# Patient Record
Sex: MALE | Race: OTHER | HISPANIC OR LATINO | Employment: FULL TIME | URBAN - METROPOLITAN AREA
[De-identification: names, ages, dates, MRNs, and addresses within clinical notes are randomized per-mention and may not be internally consistent; named-entity substitution may affect disease eponyms.]

---

## 2021-08-17 ENCOUNTER — OFFICE VISIT (OUTPATIENT)
Dept: FAMILY MEDICINE CLINIC | Facility: CLINIC | Age: 31
End: 2021-08-17
Payer: MEDICARE

## 2021-08-17 VITALS
WEIGHT: 226.6 LBS | OXYGEN SATURATION: 97 % | RESPIRATION RATE: 20 BRPM | SYSTOLIC BLOOD PRESSURE: 120 MMHG | TEMPERATURE: 98 F | HEIGHT: 69 IN | HEART RATE: 82 BPM | BODY MASS INDEX: 33.56 KG/M2 | DIASTOLIC BLOOD PRESSURE: 81 MMHG

## 2021-08-17 DIAGNOSIS — Z83.3 FAMILY HISTORY OF DIABETES MELLITUS (DM): ICD-10-CM

## 2021-08-17 DIAGNOSIS — F10.10 ALCOHOL ABUSE: Primary | ICD-10-CM

## 2021-08-17 DIAGNOSIS — Z11.4 SCREENING FOR HIV (HUMAN IMMUNODEFICIENCY VIRUS): ICD-10-CM

## 2021-08-17 DIAGNOSIS — Z11.59 ENCOUNTER FOR HEPATITIS C SCREENING TEST FOR LOW RISK PATIENT: ICD-10-CM

## 2021-08-17 DIAGNOSIS — R53.83 OTHER FATIGUE: ICD-10-CM

## 2021-08-17 PROCEDURE — 99204 OFFICE O/P NEW MOD 45 MIN: CPT | Performed by: PHYSICIAN ASSISTANT

## 2021-08-17 RX ORDER — DISULFIRAM 250 MG/1
250 TABLET ORAL DAILY
Qty: 30 TABLET | Refills: 3 | Status: SHIPPED | OUTPATIENT
Start: 2021-08-17 | End: 2021-08-31 | Stop reason: SDUPTHER

## 2021-08-17 NOTE — ASSESSMENT & PLAN NOTE
Difficult to quantify amount of alcohol he is drinking daily, but it is affecting his daily life and his relationships with his family members  He is drinking even while at work  He admits to drinking at leas a 6-pack of beer daily and additionally will drink whatever he can get access to  This has been a problem for several years  He went to rehab in [place] in March 2021 and reports that he stopped drinking for a month after that  Rehab was mostly group therapy  He does not remember what his first drink was when he started drinking again; he denies any stress or trigger that caused him to start drinking  He is now drinking daily again, but last drink was last night at 10pm      Strongly advised Jhonatan to [facility] for safe withdrawal from alcohol, however he is not interested in any inpatient treatment  I explained that it is not rehab and that it is much different than what he experienced in [facility], but he declined  I explained the dangers associated with alcohol withdrawal in detail  4 DUIs in the past year  He denies any withdrawal symptoms when he was in rehab before  Begin disulfiram 250mg daily  CBC and CMP to be drawn today, and will recheck in 2 weeks to ensure stability of renal and hepatic function  It has been over 12 hours since his last drink, so he may start the medication today  Return in 2 weeks for follow up and to consider uptitration of dose  Andys declines psychology referral at this time; he reports that he has good family support

## 2021-08-17 NOTE — PROGRESS NOTES
Assessment/Plan:    Alcohol abuse  Difficult to quantify amount of alcohol he is drinking daily, but it is affecting his daily life and his relationships with his family members  He is drinking even while at work  He admits to drinking at leas a 6-pack of beer daily and additionally will drink whatever he can get access to  This has been a problem for several years  He went to rehab in [place] in March 2021 and reports that he stopped drinking for a month after that  Rehab was mostly group therapy  He does not remember what his first drink was when he started drinking again; he denies any stress or trigger that caused him to start drinking  He is now drinking daily again, but last drink was last night at 10pm      Strongly advised Jhonatan to consider [facility] for safe withdrawal from alcohol, however he is not interested in any inpatient treatment  I explained that it is not rehab and that it is much different than what he experienced in [facility], but he declined  I explained the dangers associated with alcohol withdrawal in detail  4 DUIs in the past year  He denies any withdrawal symptoms when he was in rehab before  Begin disulfiram 250mg daily  CBC and CMP to be drawn today, and will recheck in 2 weeks to ensure stability of renal and hepatic function  It has been over 12 hours since his last drink, so he may start the medication today  Return in 2 weeks for follow up and to consider uptitration of dose  Andys declines psychology referral at this time; he reports that he has good family support  Diagnoses and all orders for this visit:    Alcohol abuse  -     disulfiram (ANTABUSE) 250 mg tablet; Take 1 tablet (250 mg total) by mouth daily    Screening for HIV (human immunodeficiency virus)  -     HIV 1/2 Antigen/Antibody (4th Generation) w Reflex [facility]; Future    Encounter for hepatitis C screening test for low risk patient  -     Hepatitis C antibody;  Future  -     Hepatitis C antibody    Other fatigue  -     CBC and differential; Future  -     CBC and differential    Family history of diabetes mellitus (DM)  -     Comprehensive metabolic panel; Future  -     Comprehensive metabolic panel    Other orders  -     Cancel: TDAP VACCINE GREATER THAN OR EQUAL TO 8YO IM          Subjective:      Patient ID: Michelle Villagomez is a 33 y o  male  Andys presents to the office today with his father to establish care and for evaluation of alcohol abuse  He has no acute complaints  The following portions of the patient's history were reviewed and updated as appropriate: allergies, current medications, past family history, past medical history, past social history, past surgical history and problem list     Review of Systems   Constitutional: Negative for chills, fever and unexpected weight change  HENT: Negative for congestion, rhinorrhea and sore throat  Eyes: Negative for visual disturbance  Respiratory: Negative for cough and shortness of breath  Cardiovascular: Negative for chest pain, palpitations and leg swelling  Gastrointestinal: Negative for abdominal pain, constipation, diarrhea, nausea and vomiting  Genitourinary: Negative for dysuria  Musculoskeletal: Negative for arthralgias and neck pain  Neurological: Negative for dizziness, seizures, syncope, light-headedness, numbness and headaches  Psychiatric/Behavioral: Negative for sleep disturbance  The patient is not nervous/anxious  Objective:      /81 (BP Location: Left arm, Patient Position: Sitting, Cuff Size: Standard)   Pulse 82   Temp 98 °F (36 7 °C) (Temporal)   Resp 20   Ht 5' 9" (1 753 m)   Wt 103 kg (226 lb 9 6 oz)   SpO2 97%   BMI 33 46 kg/m²          Physical Exam  Vitals reviewed  Constitutional:       General: He is not in acute distress  Appearance: Normal appearance  He is not toxic-appearing  HENT:      Head: Normocephalic     Eyes:      Extraocular Movements: Extraocular movements intact  Cardiovascular:      Rate and Rhythm: Normal rate and regular rhythm  Pulses: Normal pulses  Heart sounds: No murmur heard  Pulmonary:      Effort: Pulmonary effort is normal  No respiratory distress  Breath sounds: Normal breath sounds  Abdominal:      General: Bowel sounds are normal  There is no distension  Palpations: Abdomen is soft  There is no fluid wave or hepatomegaly  Tenderness: There is no abdominal tenderness  There is no guarding  Musculoskeletal:         General: Normal range of motion  Cervical back: Normal range of motion  Skin:     General: Skin is warm and dry  Neurological:      Mental Status: He is alert and oriented to person, place, and time  Gait: Gait normal    Psychiatric:         Mood and Affect: Mood normal          Behavior: Behavior normal          Thought Content:  Thought content normal

## 2021-08-19 DIAGNOSIS — E87.5 HYPERKALEMIA: Primary | ICD-10-CM

## 2021-08-19 LAB
ALBUMIN SERPL-MCNC: 4.5 G/DL (ref 4–5)
ALBUMIN/GLOB SERPL: 1.5 {RATIO} (ref 1.2–2.2)
ALP SERPL-CCNC: 47 IU/L (ref 48–121)
ALT SERPL-CCNC: 17 IU/L (ref 0–44)
AST SERPL-CCNC: 21 IU/L (ref 0–40)
BASOPHILS # BLD AUTO: 0.1 X10E3/UL (ref 0–0.2)
BASOPHILS NFR BLD AUTO: 1 %
BILIRUB SERPL-MCNC: 0.5 MG/DL (ref 0–1.2)
BUN SERPL-MCNC: 10 MG/DL (ref 6–20)
BUN/CREAT SERPL: 11 (ref 9–20)
CALCIUM SERPL-MCNC: 10 MG/DL (ref 8.7–10.2)
CHLORIDE SERPL-SCNC: 100 MMOL/L (ref 96–106)
CO2 SERPL-SCNC: 23 MMOL/L (ref 20–29)
CREAT SERPL-MCNC: 0.94 MG/DL (ref 0.76–1.27)
EOSINOPHIL # BLD AUTO: 0.1 X10E3/UL (ref 0–0.4)
EOSINOPHIL NFR BLD AUTO: 2 %
ERYTHROCYTE [DISTWIDTH] IN BLOOD BY AUTOMATED COUNT: 14.4 % (ref 11.6–15.4)
GLOBULIN SER-MCNC: 3.1 G/DL (ref 1.5–4.5)
GLUCOSE SERPL-MCNC: 85 MG/DL (ref 65–99)
HCT VFR BLD AUTO: 48.3 % (ref 37.5–51)
HCV AB S/CO SERPL IA: <0.1 S/CO RATIO (ref 0–0.9)
HGB BLD-MCNC: 16.9 G/DL (ref 13–17.7)
IMM GRANULOCYTES # BLD: 0 X10E3/UL (ref 0–0.1)
IMM GRANULOCYTES NFR BLD: 0 %
LYMPHOCYTES # BLD AUTO: 1.4 X10E3/UL (ref 0.7–3.1)
LYMPHOCYTES NFR BLD AUTO: 15 %
MCH RBC QN AUTO: 30.5 PG (ref 26.6–33)
MCHC RBC AUTO-ENTMCNC: 35 G/DL (ref 31.5–35.7)
MCV RBC AUTO: 87 FL (ref 79–97)
MONOCYTES # BLD AUTO: 0.8 X10E3/UL (ref 0.1–0.9)
MONOCYTES NFR BLD AUTO: 8 %
NEUTROPHILS # BLD AUTO: 7.1 X10E3/UL (ref 1.4–7)
NEUTROPHILS NFR BLD AUTO: 74 %
PLATELET # BLD AUTO: 301 X10E3/UL (ref 150–450)
POTASSIUM SERPL-SCNC: 5.4 MMOL/L (ref 3.5–5.2)
PROT SERPL-MCNC: 7.6 G/DL (ref 6–8.5)
RBC # BLD AUTO: 5.54 X10E6/UL (ref 4.14–5.8)
SL AMB EGFR AFRICAN AMERICAN: 123 ML/MIN/1.73
SL AMB EGFR NON AFRICAN AMERICAN: 106 ML/MIN/1.73
SODIUM SERPL-SCNC: 136 MMOL/L (ref 134–144)
WBC # BLD AUTO: 9.5 X10E3/UL (ref 3.4–10.8)

## 2021-08-30 RX ORDER — AMOXICILLIN 500 MG/1
CAPSULE ORAL
COMMUNITY
Start: 2021-08-17

## 2021-08-31 ENCOUNTER — OFFICE VISIT (OUTPATIENT)
Dept: FAMILY MEDICINE CLINIC | Facility: CLINIC | Age: 31
End: 2021-08-31
Payer: MEDICARE

## 2021-08-31 VITALS
SYSTOLIC BLOOD PRESSURE: 121 MMHG | OXYGEN SATURATION: 97 % | TEMPERATURE: 98.1 F | HEIGHT: 69 IN | HEART RATE: 91 BPM | BODY MASS INDEX: 33.38 KG/M2 | DIASTOLIC BLOOD PRESSURE: 88 MMHG | WEIGHT: 225.4 LBS | RESPIRATION RATE: 20 BRPM

## 2021-08-31 DIAGNOSIS — E87.5 HYPERKALEMIA: ICD-10-CM

## 2021-08-31 DIAGNOSIS — F10.10 ALCOHOL ABUSE: ICD-10-CM

## 2021-08-31 DIAGNOSIS — F10.10 ALCOHOL ABUSE: Primary | ICD-10-CM

## 2021-08-31 PROCEDURE — 99214 OFFICE O/P EST MOD 30 MIN: CPT | Performed by: PHYSICIAN ASSISTANT

## 2021-08-31 RX ORDER — DISULFIRAM 500 MG/1
500 TABLET ORAL DAILY
Qty: 30 TABLET | Refills: 3 | Status: SHIPPED | OUTPATIENT
Start: 2021-08-31 | End: 2021-08-31

## 2021-08-31 RX ORDER — DISULFIRAM 250 MG/1
500 TABLET ORAL DAILY
Qty: 60 TABLET | Refills: 3 | Status: SHIPPED | OUTPATIENT
Start: 2021-08-31

## 2021-08-31 NOTE — TELEPHONE ENCOUNTER
Requested medication(s) are due for refill today: Yes  Patient has already received a courtesy refill: No  Other reason request has been forwarded to provider: NOT COVERED

## 2021-08-31 NOTE — PROGRESS NOTES
Assessment/Plan:    Alcohol abuse  Pt reports that he had two drinks since starting disulfiram  He reports good compliance with the medication and his cravings are still present but are decreased  Pt is willing to speak with a substance abuse counselor  LFTs within normal limits at initiation of medication  Repeat CMP today  Increasing disulfiram dose to 500mg daily; repeat CMP in 2 weeks to ensure LFT stability  Substance abuse counselor referral placed  Diagnoses and all orders for this visit:    Alcohol abuse  -     Comprehensive metabolic panel; Future  -     disulfiram 500 MG TABS; Take 1 tablet (500 mg total) by mouth daily  -     Ambulatory Referral to Substance Abuse Counselor; Future  -     Comprehensive metabolic panel; Future    Hyperkalemia  -     Comprehensive metabolic panel; Future    Other orders  -     amoxicillin (AMOXIL) 500 mg capsule          Subjective:      Patient ID: Radha Collins is a 33 y o  male  Andys presents to the office today for follow up regarding alcohol abuse  He has no acute complaints  The following portions of the patient's history were reviewed and updated as appropriate: allergies, current medications, past family history, past medical history, past social history, past surgical history and problem list     Review of Systems   Constitutional: Negative for chills, fever and unexpected weight change  HENT: Negative for congestion, rhinorrhea and sore throat  Eyes: Negative for visual disturbance  Respiratory: Negative for cough and shortness of breath  Cardiovascular: Negative for chest pain, palpitations and leg swelling  Gastrointestinal: Negative for abdominal pain, constipation, diarrhea, nausea and vomiting  Genitourinary: Negative for dysuria  Musculoskeletal: Negative for arthralgias and neck pain  Neurological: Negative for syncope and headaches  Psychiatric/Behavioral: The patient is not nervous/anxious  Objective:      /88 (BP Location: Left arm, Patient Position: Sitting, Cuff Size: Standard)   Pulse 91   Temp 98 1 °F (36 7 °C) (Temporal)   Resp 20   Ht 5' 9" (1 753 m)   Wt 102 kg (225 lb 6 4 oz)   SpO2 97%   BMI 33 29 kg/m²          Physical Exam  Vitals reviewed  Constitutional:       General: He is not in acute distress  Appearance: He is obese  He is not toxic-appearing  HENT:      Head: Normocephalic  Eyes:      Extraocular Movements: Extraocular movements intact  Conjunctiva/sclera: Conjunctivae normal    Cardiovascular:      Rate and Rhythm: Normal rate and regular rhythm  Pulses: Normal pulses  Heart sounds: No murmur heard  Pulmonary:      Effort: Pulmonary effort is normal  No respiratory distress  Breath sounds: Normal breath sounds  Abdominal:      General: Bowel sounds are normal  There is no distension  Palpations: Abdomen is soft  There is no mass  Tenderness: There is no abdominal tenderness  There is no guarding or rebound  Hernia: No hernia is present  Musculoskeletal:         General: Normal range of motion  Cervical back: Normal range of motion  Right lower leg: No edema  Left lower leg: No edema  Skin:     General: Skin is warm and dry  Findings: No bruising or rash  Neurological:      Mental Status: He is alert and oriented to person, place, and time  Gait: Gait normal    Psychiatric:         Mood and Affect: Mood normal          Behavior: Behavior normal          Thought Content: Thought content normal          BMI Counseling: Body mass index is 33 29 kg/m²  The BMI is above normal  Nutrition recommendations include reducing portion sizes and 3-5 servings of fruits/vegetables daily  Exercise recommendations include exercising 3-5 times per week

## 2021-08-31 NOTE — ASSESSMENT & PLAN NOTE
Pt reports that he had two drinks since starting disulfiram  He reports good compliance with the medication and his cravings are still present but are decreased  Pt is willing to speak with a substance abuse counselor  LFTs within normal limits at initiation of medication  Repeat CMP today  Increasing disulfiram dose to 500mg daily; repeat CMP in 2 weeks to ensure LFT stability  Substance abuse counselor referral placed

## 2024-09-24 ENCOUNTER — TELEPHONE (OUTPATIENT)
Age: 34
End: 2024-09-24